# Patient Record
Sex: MALE | Race: WHITE | NOT HISPANIC OR LATINO | Employment: OTHER | URBAN - METROPOLITAN AREA
[De-identification: names, ages, dates, MRNs, and addresses within clinical notes are randomized per-mention and may not be internally consistent; named-entity substitution may affect disease eponyms.]

---

## 2018-01-30 PROBLEM — G47.33 OBSTRUCTIVE SLEEP APNEA SYNDROME: Status: ACTIVE | Noted: 2018-01-30

## 2018-01-30 PROBLEM — E11.9 TYPE 2 DIABETES MELLITUS WITHOUT COMPLICATION, WITHOUT LONG-TERM CURRENT USE OF INSULIN (HCC): Status: ACTIVE | Noted: 2018-01-30

## 2018-01-30 PROBLEM — I10 HTN, GOAL BELOW 130/80: Status: ACTIVE | Noted: 2018-01-30

## 2018-01-30 PROBLEM — I25.10 CORONARY ARTERY DISEASE INVOLVING NATIVE CORONARY ARTERY OF NATIVE HEART WITHOUT ANGINA PECTORIS: Status: ACTIVE | Noted: 2018-01-30

## 2018-01-30 PROBLEM — I50.42 CHRONIC COMBINED SYSTOLIC AND DIASTOLIC CONGESTIVE HEART FAILURE (HCC): Status: ACTIVE | Noted: 2018-01-30

## 2018-04-05 PROBLEM — H54.8 LEGALLY BLIND: Status: ACTIVE | Noted: 2018-04-05

## 2018-08-18 PROBLEM — E66.9 OBESITY (BMI 30-39.9): Status: ACTIVE | Noted: 2018-08-18

## 2022-01-02 PROBLEM — I50.9 HEART FAILURE (HCC): Status: ACTIVE | Noted: 2022-01-02

## 2022-01-07 PROBLEM — J90 BILATERAL PLEURAL EFFUSION: Status: ACTIVE | Noted: 2022-01-07

## 2022-01-07 PROBLEM — R07.89 OTHER CHEST PAIN: Status: ACTIVE | Noted: 2022-01-07

## 2022-05-11 ENCOUNTER — TELEPHONE (OUTPATIENT)
Dept: CARDIOLOGY | Facility: MEDICAL CENTER | Age: 67
End: 2022-05-11

## 2022-05-11 NOTE — TELEPHONE ENCOUNTER
----- Message from Mukul Lizarraga M.D. sent at 5/10/2022  3:21 PM PDT -----  Can you please call patient's wife and let him know that Jose's echocardiogram shows normal left ventricular systolic function with no significant abnormalities?  No change to POC.  Thanks.

## 2022-05-11 NOTE — TELEPHONE ENCOUNTER
Called pt and wife [phone]  No answer, left VM to call [phone]  Regarding echo results per AB

## 2022-05-12 NOTE — TELEPHONE ENCOUNTER
Pt wife Ana returned call.  Relayed echo results per DA. Reviewed prior echo completed in 2021 which shows 30-35%EF vs recent echo which shows 50%EF. Ana verbalized understanding and is appreciative of call.

## 2022-05-12 NOTE — TELEPHONE ENCOUNTER
Called pt and wife [phone]  No answer, left VM to call [phone]  Regarding echo results per AB

## 2023-08-14 PROBLEM — D64.9 NORMOCYTIC ANEMIA: Status: ACTIVE | Noted: 2023-08-14

## 2023-08-14 PROBLEM — E11.621 TYPE 2 DIABETES MELLITUS WITH FOOT ULCER, WITH LONG-TERM CURRENT USE OF INSULIN (HCC): Status: ACTIVE | Noted: 2023-08-14

## 2023-08-14 PROBLEM — L97.509 TYPE 2 DIABETES MELLITUS WITH FOOT ULCER, WITH LONG-TERM CURRENT USE OF INSULIN (HCC): Status: ACTIVE | Noted: 2023-08-14

## 2023-08-14 PROBLEM — Z79.4 TYPE 2 DIABETES MELLITUS WITH FOOT ULCER, WITH LONG-TERM CURRENT USE OF INSULIN (HCC): Status: ACTIVE | Noted: 2023-08-14

## 2023-08-14 PROBLEM — G30.0 ALZHEIMER'S DISEASE WITH EARLY ONSET (CODE) (HCC): Status: ACTIVE | Noted: 2023-08-14

## 2023-08-14 PROBLEM — M86.9 OSTEOMYELITIS OF RIGHT FOOT, UNSPECIFIED TYPE (HCC): Status: ACTIVE | Noted: 2023-08-14

## 2023-08-14 PROBLEM — I50.22 CHRONIC SYSTOLIC HEART FAILURE (HCC): Status: ACTIVE | Noted: 2022-01-02

## 2023-08-14 PROBLEM — L03.031 CELLULITIS OF TOE OF RIGHT FOOT: Status: ACTIVE | Noted: 2023-08-14

## 2023-08-24 PROBLEM — M19.90 ARTHRITIS: Status: ACTIVE | Noted: 2023-08-24

## 2023-08-24 PROBLEM — Z12.5 ENCOUNTER FOR PROSTATE CANCER SCREENING: Status: ACTIVE | Noted: 2023-08-24

## 2023-08-24 PROBLEM — Z79.899 HIGH RISK MEDICATION USE: Status: ACTIVE | Noted: 2023-08-24

## 2023-08-24 PROBLEM — F91.9 BEHAVIOR DISTURBANCE: Status: ACTIVE | Noted: 2023-08-24

## 2023-08-24 PROBLEM — Z13.29 SCREENING FOR THYROID DISORDER: Status: ACTIVE | Noted: 2023-08-24

## 2023-08-24 PROBLEM — I50.9 CONGESTIVE HEART FAILURE (HCC): Status: ACTIVE | Noted: 2023-08-24

## 2023-08-24 PROBLEM — E11.9 DIABETES MELLITUS (HCC): Status: ACTIVE | Noted: 2023-08-24

## 2023-08-24 PROBLEM — E11.319 CONTROLLED TYPE 2 DIABETES MELLITUS WITH RETINOPATHY, WITH LONG-TERM CURRENT USE OF INSULIN (HCC): Status: ACTIVE | Noted: 2023-08-24

## 2023-08-24 PROBLEM — Z79.4 CONTROLLED TYPE 2 DIABETES MELLITUS WITH RETINOPATHY, WITH LONG-TERM CURRENT USE OF INSULIN (HCC): Status: ACTIVE | Noted: 2023-08-24

## 2023-08-24 PROBLEM — E78.5 DYSLIPIDEMIA: Status: ACTIVE | Noted: 2023-08-24

## 2023-09-13 PROBLEM — Z71.2 ENCOUNTER TO DISCUSS TEST RESULTS: Status: ACTIVE | Noted: 2023-09-13
